# Patient Record
Sex: MALE | Race: WHITE
[De-identification: names, ages, dates, MRNs, and addresses within clinical notes are randomized per-mention and may not be internally consistent; named-entity substitution may affect disease eponyms.]

---

## 2019-04-20 ENCOUNTER — HOSPITAL ENCOUNTER (INPATIENT)
Dept: HOSPITAL 12 - ER | Age: 74
LOS: 2 days | Discharge: HOME | DRG: 73 | End: 2019-04-22
Payer: MEDICARE

## 2019-04-20 VITALS — HEIGHT: 68 IN | BODY MASS INDEX: 30.93 KG/M2 | WEIGHT: 204.06 LBS

## 2019-04-20 DIAGNOSIS — G90.8: Primary | ICD-10-CM

## 2019-04-20 DIAGNOSIS — D50.9: ICD-10-CM

## 2019-04-20 DIAGNOSIS — I10: ICD-10-CM

## 2019-04-20 DIAGNOSIS — N17.0: ICD-10-CM

## 2019-04-20 DIAGNOSIS — I95.9: ICD-10-CM

## 2019-04-20 DIAGNOSIS — R94.6: ICD-10-CM

## 2019-04-20 DIAGNOSIS — E66.9: ICD-10-CM

## 2019-04-20 DIAGNOSIS — R73.9: ICD-10-CM

## 2019-04-20 DIAGNOSIS — E78.5: ICD-10-CM

## 2019-04-20 DIAGNOSIS — Z71.3: ICD-10-CM

## 2019-04-20 DIAGNOSIS — Z87.891: ICD-10-CM

## 2019-04-20 DIAGNOSIS — E44.1: ICD-10-CM

## 2019-04-20 DIAGNOSIS — H40.9: ICD-10-CM

## 2019-04-20 DIAGNOSIS — E86.0: ICD-10-CM

## 2019-04-20 PROCEDURE — A4663 DIALYSIS BLOOD PRESSURE CUFF: HCPCS

## 2019-04-20 PROCEDURE — G0378 HOSPITAL OBSERVATION PER HR: HCPCS

## 2019-04-20 NOTE — NUR
Pt. BIB [facility] 81 for near syncope from home, A/Ox4, accompanied by nephew, Farsi 
speaking, bed in low position, all pt. needs met,

## 2019-04-21 VITALS — SYSTOLIC BLOOD PRESSURE: 104 MMHG | DIASTOLIC BLOOD PRESSURE: 65 MMHG

## 2019-04-21 VITALS — SYSTOLIC BLOOD PRESSURE: 108 MMHG | DIASTOLIC BLOOD PRESSURE: 64 MMHG

## 2019-04-21 VITALS — SYSTOLIC BLOOD PRESSURE: 106 MMHG | DIASTOLIC BLOOD PRESSURE: 64 MMHG

## 2019-04-21 VITALS — SYSTOLIC BLOOD PRESSURE: 100 MMHG | DIASTOLIC BLOOD PRESSURE: 62 MMHG

## 2019-04-21 VITALS — DIASTOLIC BLOOD PRESSURE: 75 MMHG | SYSTOLIC BLOOD PRESSURE: 110 MMHG

## 2019-04-21 LAB
ALP SERPL-CCNC: 75 U/L (ref 50–136)
ALT SERPL W/O P-5'-P-CCNC: 20 U/L (ref 16–63)
APPEARANCE UR: CLEAR
APPEARANCE UR: CLEAR
AST SERPL-CCNC: 11 U/L (ref 15–37)
BASOPHILS # BLD AUTO: 0 K/UL (ref 0–8)
BASOPHILS # BLD AUTO: 0.1 K/UL (ref 0–8)
BASOPHILS NFR BLD AUTO: 0.5 % (ref 0–2)
BASOPHILS NFR BLD AUTO: 1.3 % (ref 0–2)
BILIRUB DIRECT SERPL-MCNC: 0.1 MG/DL (ref 0–0.2)
BILIRUB SERPL-MCNC: 0.2 MG/DL (ref 0.2–1)
BILIRUB UR QL STRIP: NEGATIVE
BILIRUB UR QL STRIP: NEGATIVE
BUN SERPL-MCNC: 24 MG/DL (ref 7–18)
BUN SERPL-MCNC: 25 MG/DL (ref 7–18)
CHLORIDE SERPL-SCNC: 104 MMOL/L (ref 98–107)
CHLORIDE SERPL-SCNC: 106 MMOL/L (ref 98–107)
CO2 SERPL-SCNC: 26 MMOL/L (ref 21–32)
CO2 SERPL-SCNC: 26 MMOL/L (ref 21–32)
COLOR UR: YELLOW
COLOR UR: YELLOW
CREAT SERPL-MCNC: 1.6 MG/DL (ref 0.6–1.3)
CREAT SERPL-MCNC: 1.8 MG/DL (ref 0.6–1.3)
CREAT UR-MCNC: 64.1 MG/DL (ref 30–125)
EOSINOPHIL # BLD AUTO: 0.1 K/UL (ref 0–0.7)
EOSINOPHIL # BLD AUTO: 0.3 K/UL (ref 0–0.7)
EOSINOPHIL NFR BLD AUTO: 1.6 % (ref 0–7)
EOSINOPHIL NFR BLD AUTO: 3.8 % (ref 0–7)
EOSINOPHIL NFR BLD MANUAL: 1 % (ref 0–8)
GLUCOSE SERPL-MCNC: 107 MG/DL (ref 74–106)
GLUCOSE SERPL-MCNC: 111 MG/DL (ref 74–106)
GLUCOSE UR STRIP-MCNC: NEGATIVE MG/DL
GLUCOSE UR STRIP-MCNC: NEGATIVE MG/DL
HCT VFR BLD AUTO: 27 % (ref 36.7–47.1)
HCT VFR BLD AUTO: 29.5 % (ref 36.7–47.1)
HGB BLD-MCNC: 8.5 G/DL (ref 12.5–16.3)
HGB BLD-MCNC: 9.4 G/DL (ref 12.5–16.3)
HGB UR QL STRIP: NEGATIVE
HGB UR QL STRIP: NEGATIVE
IRON SERPL-MCNC: 32 UG/DL (ref 50–175)
KETONES UR STRIP-MCNC: NEGATIVE MG/DL
KETONES UR STRIP-MCNC: NEGATIVE MG/DL
LEUKOCYTE ESTERASE UR QL STRIP: NEGATIVE
LEUKOCYTE ESTERASE UR QL STRIP: NEGATIVE
LYMPHOCYTES # BLD AUTO: 1.5 K/UL (ref 20–40)
LYMPHOCYTES # BLD AUTO: 2.9 K/UL (ref 20–40)
LYMPHOCYTES NFR BLD AUTO: 22.1 % (ref 20.5–51.5)
LYMPHOCYTES NFR BLD AUTO: 32.9 % (ref 20.5–51.5)
LYMPHOCYTES NFR BLD MANUAL: 23 % (ref 20–40)
MCH RBC QN AUTO: 23 UUG (ref 23.8–33.4)
MCH RBC QN AUTO: 23.4 UUG (ref 23.8–33.4)
MCHC RBC AUTO-ENTMCNC: 31 G/DL (ref 32.5–36.3)
MCHC RBC AUTO-ENTMCNC: 32 G/DL (ref 32.5–36.3)
MCV RBC AUTO: 73.4 FL (ref 73–96.2)
MCV RBC AUTO: 73.5 FL (ref 73–96.2)
MONOCYTES # BLD AUTO: 0.5 K/UL (ref 2–10)
MONOCYTES # BLD AUTO: 0.8 K/UL (ref 2–10)
MONOCYTES NFR BLD AUTO: 7.2 % (ref 0–11)
MONOCYTES NFR BLD AUTO: 9.1 % (ref 0–11)
MONOCYTES NFR BLD MANUAL: 8 % (ref 2–10)
NEUTROPHILS # BLD AUTO: 4.6 K/UL (ref 1.8–8.9)
NEUTROPHILS # BLD AUTO: 4.7 K/UL (ref 1.8–8.9)
NEUTROPHILS NFR BLD AUTO: 52.9 % (ref 38.5–71.5)
NEUTROPHILS NFR BLD AUTO: 68.6 % (ref 38.5–71.5)
NEUTS BAND NFR BLD MANUAL: 4 % (ref 0–10)
NEUTS SEG NFR BLD MANUAL: 64 % (ref 42–75)
NITRITE UR QL STRIP: NEGATIVE
NITRITE UR QL STRIP: NEGATIVE
PH UR STRIP: 7 [PH] (ref 5–8)
PH UR STRIP: 7 [PH] (ref 5–8)
PLATELET # BLD AUTO: 192 K/UL (ref 152–348)
PLATELET # BLD AUTO: 223 K/UL (ref 152–348)
POTASSIUM SERPL-SCNC: 3.6 MMOL/L (ref 3.5–5.1)
POTASSIUM SERPL-SCNC: 5.1 MMOL/L (ref 3.5–5.1)
PROT UR-MCNC: < 6 MG/DL
RBC # BLD AUTO: 3.68 MIL/UL (ref 4.06–5.63)
RBC # BLD AUTO: 4.02 MIL/UL (ref 4.06–5.63)
RBC #/AREA URNS HPF: (no result) /HPF (ref 0–3)
SP GR UR STRIP: 1.01 (ref 1–1.03)
SP GR UR STRIP: 1.01 (ref 1–1.03)
UROBILINOGEN UR STRIP-MCNC: 0.2 E.U./DL
UROBILINOGEN UR STRIP-MCNC: 0.2 E.U./DL
WBC # BLD AUTO: 6.8 K/UL (ref 3.6–10.2)
WBC # BLD AUTO: 8.8 K/UL (ref 3.6–10.2)
WBC #/AREA URNS HPF: (no result) /HPF (ref 0–3)
WBC #/AREA URNS HPF: (no result) /HPF (ref 0–3)
WS STN SPEC: 6.1 G/DL (ref 6.4–8.2)

## 2019-04-21 RX ADMIN — SODIUM CHLORIDE PRN MLS/HR: 0.9 INJECTION, SOLUTION INTRAVENOUS at 16:31

## 2019-04-21 RX ADMIN — SODIUM CHLORIDE PRN MLS/HR: 0.9 INJECTION, SOLUTION INTRAVENOUS at 03:02

## 2019-04-21 RX ADMIN — ASPIRIN SCH MG: 81 TABLET, CHEWABLE ORAL at 12:47

## 2019-04-21 NOTE — NUR
No acute distress noted at this time.  Patient sleeping well in bed.  NS running at 75mls/hr 
on left hand.   Call light within reach.

## 2019-04-21 NOTE — NUR
Received patient lying in bed, awake, alert, oriented x 4 and ambulatory. Patient is in room 
air, tolerated. Not in any form of distress. With IV access on the left hand to ongoing IV 
fluid, infusing well. No complaints at the moment. Still awaiting Neurology consult. Bed in 
low position, locked, side rails up x 2, call light within reach. Will continue to monitor.

## 2019-04-21 NOTE — NUR
Admitted 74 year old male with diagnosis of SYNCOPE. Transferred from ER via gurney. Family 
at bedside. Patient AAOx4. Ambulatory.  Denies pain. No SOB.  IV on left hand intact and 
patent.  /64. Denies feeling dizzy or nauseated. Tele monitors placed.  Routine 
admission care done. Plan of care initiated. Safety measures implemented.  Call bell within 
reach.  All needs met at this time.  Will continue to monitor throughout shift.



Patient family instructed to notify them with any change in status.

Kassi (daughter) - [phone]

Hunter (nephew) - [phone]

## 2019-04-22 VITALS — DIASTOLIC BLOOD PRESSURE: 54 MMHG | SYSTOLIC BLOOD PRESSURE: 104 MMHG

## 2019-04-22 VITALS — SYSTOLIC BLOOD PRESSURE: 107 MMHG | DIASTOLIC BLOOD PRESSURE: 66 MMHG

## 2019-04-22 LAB
ALP SERPL-CCNC: 69 U/L (ref 50–136)
ALT SERPL W/O P-5'-P-CCNC: 15 U/L (ref 16–63)
AST SERPL-CCNC: 9 U/L (ref 15–37)
BASOPHILS # BLD AUTO: 0 K/UL (ref 0–8)
BASOPHILS NFR BLD AUTO: 0.7 % (ref 0–2)
BILIRUB SERPL-MCNC: 0.3 MG/DL (ref 0.2–1)
BUN SERPL-MCNC: 20 MG/DL (ref 7–18)
CHLORIDE SERPL-SCNC: 107 MMOL/L (ref 98–107)
CHOLEST SERPL-MCNC: 100 MG/DL (ref ?–200)
CK SERPL-CCNC: 44 U/L (ref 39–308)
CO2 SERPL-SCNC: 27 MMOL/L (ref 21–32)
CREAT SERPL-MCNC: 1.2 MG/DL (ref 0.6–1.3)
EOSINOPHIL # BLD AUTO: 0.3 K/UL (ref 0–0.7)
EOSINOPHIL NFR BLD AUTO: 5 % (ref 0–7)
EOSINOPHIL NFR BLD MANUAL: 1 % (ref 0–8)
FERRITIN SERPL-MCNC: 9 NG/ML (ref 26–388)
GLUCOSE SERPL-MCNC: 90 MG/DL (ref 74–106)
HCT VFR BLD AUTO: 27.3 % (ref 36.7–47.1)
HDLC SERPL-MCNC: 52 MG/DL (ref 40–60)
HGB BLD-MCNC: 8.7 G/DL (ref 12.5–16.3)
LYMPHOCYTES # BLD AUTO: 2 K/UL (ref 20–40)
LYMPHOCYTES NFR BLD AUTO: 35.2 % (ref 20.5–51.5)
LYMPHOCYTES NFR BLD MANUAL: 33 % (ref 20–40)
MAGNESIUM SERPL-MCNC: 1.9 MG/DL (ref 1.8–2.4)
MCH RBC QN AUTO: 23.3 UUG (ref 23.8–33.4)
MCHC RBC AUTO-ENTMCNC: 32 G/DL (ref 32.5–36.3)
MCV RBC AUTO: 73.3 FL (ref 73–96.2)
MONOCYTES # BLD AUTO: 0.4 K/UL (ref 2–10)
MONOCYTES NFR BLD AUTO: 7.9 % (ref 0–11)
MONOCYTES NFR BLD MANUAL: 5 % (ref 2–10)
NEUTROPHILS # BLD AUTO: 2.9 K/UL (ref 1.8–8.9)
NEUTROPHILS NFR BLD AUTO: 51.2 % (ref 38.5–71.5)
NEUTS SEG NFR BLD MANUAL: 61 % (ref 42–75)
PHOSPHATE SERPL-MCNC: 2.5 MG/DL (ref 2.5–4.9)
PLATELET # BLD AUTO: 186 K/UL (ref 152–348)
POTASSIUM SERPL-SCNC: 4.1 MMOL/L (ref 3.5–5.1)
RBC # BLD AUTO: 3.72 MIL/UL (ref 4.06–5.63)
TRIGL SERPL-MCNC: 33 MG/DL (ref 30–150)
TSH SERPL DL<=0.005 MIU/L-ACNC: 3.77 MIU/ML (ref 0.36–3.74)
WBC # BLD AUTO: 5.6 K/UL (ref 3.6–10.2)
WS STN SPEC: 5.6 G/DL (ref 6.4–8.2)

## 2019-04-22 RX ADMIN — SODIUM CHLORIDE PRN MLS/HR: 0.9 INJECTION, SOLUTION INTRAVENOUS at 04:39

## 2019-04-22 RX ADMIN — ASPIRIN SCH MG: 81 TABLET, CHEWABLE ORAL at 08:23

## 2019-04-22 NOTE — NUR
Patient in bed, awake, AO4. Patient is in room air. Not in any form of distress. With IV 
access on the left hand to ongoing IV fluid, infusing well. No complaints made. Still 
awaiting Neurology consult, will endorse to morning shift to follow up.

## 2019-04-22 NOTE — NUR
Patient slept well throughout the night. Patient is in room air, tolerated. Not in any form 
of distress. With IV access on the left hand to ongoing IV fluid, infusing well. No 
complaints made. Still awaiting Neurology consult, will endorse to morning shift to follow 
up.

## 2019-04-22 NOTE — NUR
Received patient awake, alert, oriented x 4 and ambulatory. Not in any form of distress. 
Noted patient's children at the bedside. Patient wants to go home today, per Dr. Busby 
patient can go home after he is seen by Dr. Adams who is presently at the bedside. Will 
continue to monitor.

## 2019-04-22 NOTE — NUR
per Dr. Adams, patient can go home after the blood is drawn for labs ordered by her. 
Processed all discharge paperwork including Authorization for release of medical information 
as requested by patient.

## 2019-04-22 NOTE — NUR
Blood draw done. Patient accompanied to hospital lobby, ambulatory and was discharged via 
private car accompanied by his daughter.

## 2019-04-23 LAB
ALBUMIN SERPL ELPH-MCNC: 3.1 G/DL (ref 2.9–4.4)
ALBUMIN/GLOB SERPL: 1.4 {RATIO} (ref 0.7–1.7)
ALPHA1 GLOB SERPL ELPH-MCNC: 0.1 G/DL (ref 0–0.4)
ALPHA2 GLOB SERPL ELPH-MCNC: 0.5 G/DL (ref 0.4–1)
B-GLOBULIN SERPL ELPH-MCNC: 0.8 G/DL (ref 0.7–1.3)
GAMMA GLOB SERPL ELPH-MCNC: 0.8 G/DL (ref 0.4–1.8)
GLOBULIN SER CALC-MCNC: 2.2 G/DL (ref 2.2–3.9)

## 2019-04-24 LAB
IGA SERPL-MCNC: 188 MG/DL (ref 61–437)
IGG SERPL-MCNC: 933 MG/DL (ref 700–1600)
IGM SERPL-MCNC: 34 MG/DL (ref 15–143)